# Patient Record
Sex: FEMALE | Race: WHITE | Employment: OTHER | URBAN - METROPOLITAN AREA
[De-identification: names, ages, dates, MRNs, and addresses within clinical notes are randomized per-mention and may not be internally consistent; named-entity substitution may affect disease eponyms.]

---

## 2020-07-17 ENCOUNTER — OFFICE VISIT (OUTPATIENT)
Dept: PRIMARY CARE CLINIC | Age: 70
End: 2020-07-17
Payer: MEDICARE

## 2020-07-17 PROCEDURE — G8428 CUR MEDS NOT DOCUMENT: HCPCS | Performed by: NURSE PRACTITIONER

## 2020-07-17 PROCEDURE — G8421 BMI NOT CALCULATED: HCPCS | Performed by: NURSE PRACTITIONER

## 2020-07-17 PROCEDURE — 99211 OFF/OP EST MAY X REQ PHY/QHP: CPT | Performed by: NURSE PRACTITIONER

## 2020-07-19 LAB
SARS-COV-2: NOT DETECTED
SOURCE: NORMAL

## 2020-07-20 RX ORDER — FLUTICASONE PROPIONATE 50 MCG
1 SPRAY, SUSPENSION (ML) NASAL DAILY
COMMUNITY

## 2020-07-20 RX ORDER — OMEPRAZOLE 20 MG/1
20 CAPSULE, DELAYED RELEASE ORAL DAILY
COMMUNITY

## 2020-07-20 RX ORDER — LIOTHYRONINE SODIUM 5 UG/1
5 TABLET ORAL DAILY
COMMUNITY

## 2020-07-20 RX ORDER — LISINOPRIL 5 MG/1
5 TABLET ORAL DAILY
COMMUNITY

## 2020-07-20 RX ORDER — SIMVASTATIN 10 MG
10 TABLET ORAL NIGHTLY
COMMUNITY

## 2020-07-20 RX ORDER — LEVOTHYROXINE SODIUM 0.12 MG/1
125 TABLET ORAL DAILY
COMMUNITY

## 2020-07-20 RX ORDER — ONDANSETRON 4 MG/1
4 TABLET, FILM COATED ORAL EVERY 8 HOURS PRN
COMMUNITY

## 2020-07-20 RX ORDER — MAGNESIUM 30 MG
250 TABLET ORAL DAILY
COMMUNITY

## 2020-07-20 RX ORDER — WARFARIN SODIUM 2 MG/1
2 TABLET ORAL DAILY
COMMUNITY

## 2020-07-20 NOTE — PROGRESS NOTES
Preoperative Screening for Elective Surgery/Invasive Procedures While COVID-19 present in the community     Have you tested positive or have been told to self-isolate for COVID-19 like symptoms within the past 28 days? n   Do you currently have any of the following symptoms? n  o Fever >100.0 F or 99.9 F in immunocompromised patients?n  o New onset cough, shortness of breath or difficulty breathing?n  o New onset sore throat, myalgia (muscle aches and pains), headache, loss of taste/smell or diarrhea?n   Have you had a potential exposure to COVID-19 within the past 14 days by:  o Close contact with a confirmed case?n  o Close contact with a healthcare worker,  or essential infrastructure worker (grocery store, [employer], gas station, public utilities or transportation)? YES  o Do you reside in a congregate setting such as; skilled nursing facility, adult home, correctional facility, homeless shelter or other institutional setting?n  o Have you had recent travel to a known COVID-19 hotspot? pt resides in [facility] if the patient has a positive screen by answering yes to one or more of the above questions. Patients who test positive or screen positive prior to surgery or on the day of surgery should be evaluated in conjunction with the surgeon/proceduralist/anesthesiologist to determine the urgency of the procedure.

## 2020-07-20 NOTE — PROGRESS NOTES
C-Difficile admission screening and protocol:     * Admitted with diarrhea? n     *Prior history of C-Diff. In last 3 months?n     *Antibiotic use in the past 6-8 weeks?n     *Prior hospitalization or nursing home in the last month? n       [address] time__________830__        Surgery time____________    Take the following medications with a sip of water:    Do not eat or drink anything after 12:00 midnight prior to your surgery. This includes water chewing gum, mints and ice chips. You may brush your teeth and gargle the morning of your surgery, but do not swallow the water     Please see your family doctor/pediatrician for a history and physical and/or concerning medications. Bring any test results/reports from your physicians office. If you are under the care of a heart doctor or specialist doctor, please be aware that you may be asked to them for clearance    You may be asked to stop blood thinners such as Coumadin, Plavix, Fragmin, Lovenox, etc., or any anti-inflammatories such as:  Aspirin, Ibuprofen, Advil, Naproxen prior to your surgery. We also ask that you stop any OTC medications such as fish oil, vitamin E, glucosamine, garlic, Multivitamins, COQ 10, etc.    We ask that you do not smoke 24 hours prior to surgery  We ask that you do not  drink any alcoholic beverages 24 hours prior to surgery     You must make arrangements for a responsible adult to take you home after your surgery. For your safety you will not be allowed to leave alone or drive yourself home. Your surgery will be cancelled if you do not have a ride home. Also for your safety, it is strongly suggested that someone stay with you the first 24 hours after your surgery. A parent or legal guardian must accompany a child scheduled for surgery and plan to stay at the hospital until the child is discharged. Please do not bring other children with you.     For your comfort, are generalized instructions for all surgical cases, you may be provided with more specific instructions according to your surgery.

## 2020-07-20 NOTE — PROGRESS NOTES
Phone message left to call PAT dept at [phone]  for history review and surgery instructions ON 7/20/2020 @ 7632

## 2020-07-22 ENCOUNTER — ANESTHESIA EVENT (OUTPATIENT)
Dept: ENDOSCOPY | Age: 70
End: 2020-07-22
Payer: MEDICARE

## 2020-07-23 ENCOUNTER — ANESTHESIA (OUTPATIENT)
Dept: ENDOSCOPY | Age: 70
End: 2020-07-23
Payer: MEDICARE

## 2020-07-23 ENCOUNTER — HOSPITAL ENCOUNTER (OUTPATIENT)
Age: 70
Setting detail: OUTPATIENT SURGERY
Discharge: HOME | End: 2020-07-23
Attending: INTERNAL MEDICINE | Admitting: INTERNAL MEDICINE
Payer: MEDICARE

## 2020-07-23 VITALS
BODY MASS INDEX: 39.55 KG/M2 | HEIGHT: 63 IN | TEMPERATURE: 98.5 F | SYSTOLIC BLOOD PRESSURE: 135 MMHG | RESPIRATION RATE: 16 BRPM | HEART RATE: 80 BPM | DIASTOLIC BLOOD PRESSURE: 64 MMHG | WEIGHT: 223.22 LBS | OXYGEN SATURATION: 98 %

## 2020-07-23 VITALS
SYSTOLIC BLOOD PRESSURE: 105 MMHG | DIASTOLIC BLOOD PRESSURE: 49 MMHG | RESPIRATION RATE: 57 BRPM | OXYGEN SATURATION: 99 %

## 2020-07-23 PROCEDURE — 3700000001 HC ADD 15 MINUTES (ANESTHESIA): Performed by: INTERNAL MEDICINE

## 2020-07-23 PROCEDURE — 7100000001 HC PACU RECOVERY - ADDTL 15 MIN: Performed by: INTERNAL MEDICINE

## 2020-07-23 PROCEDURE — 3700000000 HC ANESTHESIA ATTENDED CARE: Performed by: INTERNAL MEDICINE

## 2020-07-23 PROCEDURE — 6360000002 HC RX W HCPCS: Performed by: NURSE ANESTHETIST, CERTIFIED REGISTERED

## 2020-07-23 PROCEDURE — 7100000011 HC PHASE II RECOVERY - ADDTL 15 MIN: Performed by: INTERNAL MEDICINE

## 2020-07-23 PROCEDURE — 88305 TISSUE EXAM BY PATHOLOGIST: CPT

## 2020-07-23 PROCEDURE — 7100000010 HC PHASE II RECOVERY - FIRST 15 MIN: Performed by: INTERNAL MEDICINE

## 2020-07-23 PROCEDURE — 3609012400 HC EGD TRANSORAL BIOPSY SINGLE/MULTIPLE: Performed by: INTERNAL MEDICINE

## 2020-07-23 PROCEDURE — 2580000003 HC RX 258: Performed by: ANESTHESIOLOGY

## 2020-07-23 PROCEDURE — 7100000000 HC PACU RECOVERY - FIRST 15 MIN: Performed by: INTERNAL MEDICINE

## 2020-07-23 PROCEDURE — 2500000003 HC RX 250 WO HCPCS: Performed by: NURSE ANESTHETIST, CERTIFIED REGISTERED

## 2020-07-23 PROCEDURE — 2709999900 HC NON-CHARGEABLE SUPPLY: Performed by: INTERNAL MEDICINE

## 2020-07-23 RX ORDER — PROPOFOL 10 MG/ML
INJECTION, EMULSION INTRAVENOUS PRN
Status: DISCONTINUED | OUTPATIENT
Start: 2020-07-23 | End: 2020-07-23 | Stop reason: SDUPTHER

## 2020-07-23 RX ORDER — SODIUM CHLORIDE 9 MG/ML
INJECTION, SOLUTION INTRAVENOUS CONTINUOUS
Status: DISCONTINUED | OUTPATIENT
Start: 2020-07-23 | End: 2020-07-23 | Stop reason: HOSPADM

## 2020-07-23 RX ORDER — PROPOFOL 10 MG/ML
INJECTION, EMULSION INTRAVENOUS CONTINUOUS PRN
Status: DISCONTINUED | OUTPATIENT
Start: 2020-07-23 | End: 2020-07-23 | Stop reason: SDUPTHER

## 2020-07-23 RX ORDER — SODIUM CHLORIDE 0.9 % (FLUSH) 0.9 %
10 SYRINGE (ML) INJECTION EVERY 12 HOURS SCHEDULED
Status: DISCONTINUED | OUTPATIENT
Start: 2020-07-23 | End: 2020-07-23 | Stop reason: HOSPADM

## 2020-07-23 RX ORDER — LIDOCAINE HYDROCHLORIDE 20 MG/ML
INJECTION, SOLUTION EPIDURAL; INFILTRATION; INTRACAUDAL; PERINEURAL PRN
Status: DISCONTINUED | OUTPATIENT
Start: 2020-07-23 | End: 2020-07-23 | Stop reason: SDUPTHER

## 2020-07-23 RX ORDER — ONDANSETRON 2 MG/ML
4 INJECTION INTRAMUSCULAR; INTRAVENOUS
Status: DISCONTINUED | OUTPATIENT
Start: 2020-07-23 | End: 2020-07-23 | Stop reason: HOSPADM

## 2020-07-23 RX ORDER — SODIUM CHLORIDE 0.9 % (FLUSH) 0.9 %
10 SYRINGE (ML) INJECTION PRN
Status: DISCONTINUED | OUTPATIENT
Start: 2020-07-23 | End: 2020-07-23 | Stop reason: HOSPADM

## 2020-07-23 RX ADMIN — PROPOFOL 100 MCG/KG/MIN: 10 INJECTION, EMULSION INTRAVENOUS at 10:09

## 2020-07-23 RX ADMIN — PROPOFOL 120 MCG/KG/MIN: 10 INJECTION, EMULSION INTRAVENOUS at 10:14

## 2020-07-23 RX ADMIN — PROPOFOL 50 MG: 10 INJECTION, EMULSION INTRAVENOUS at 10:14

## 2020-07-23 RX ADMIN — SODIUM CHLORIDE: 9 INJECTION, SOLUTION INTRAVENOUS at 10:09

## 2020-07-23 RX ADMIN — LIDOCAINE HYDROCHLORIDE 50 MG: 20 INJECTION, SOLUTION EPIDURAL; INFILTRATION; INTRACAUDAL; PERINEURAL at 10:12

## 2020-07-23 ASSESSMENT — PULMONARY FUNCTION TESTS
PIF_VALUE: 0

## 2020-07-23 ASSESSMENT — PAIN SCALES - GENERAL
PAINLEVEL_OUTOF10: 0

## 2020-07-23 NOTE — PROGRESS NOTES
Patient awake and alert. Resp easy unlabored on room air O2 with SaO2 98%. Abdomen rounded soft. Patient denies C/O pain or nausea. VSS. IV patent. Patient stable to transfer to ACU for phase II.

## 2020-07-23 NOTE — H&P
Pre-operative History and Physical    Patient: William Jackson  : 1950  Acct#:     Intended Procedure: EGD    HISTORY OF PRESENT ILLNESS:  The patient is a 81 y.o. female  who presents for/due to nausea and vomiting      Past Medical History:        Diagnosis Date    [facility] [facility]     Cerebral artery occlusion with cerebral infarction ([facility] [address] [facility]     mini-stroke     GERD (gastroesophageal reflux disease)     Hyperlipidemia     Hypertension     Sinus disease     Thyroid disease      Past Surgical History:        Procedure Laterality Date    ABDOMEN SURGERY      CHOLECYSTECTOMY      COLONOSCOPY      COSMETIC SURGERY      eyelid surgery    ENDOSCOPY, COLON, DIAGNOSTIC      HEEL SPUR SURGERY      bi-lateral    HERNIA REPAIR      HYSTERECTOMY      KNEE ARTHROSCOPY      NASAL SINUS SURGERY      PACEMAKER PLACEMENT      PACEMAKER PLACEMENT      TONSILLECTOMY       Medications Prior to Admission:   No current facility-administered medications on file prior to encounter. Current Outpatient Medications on File Prior to Encounter   Medication Sig Dispense Refill    fluticasone (FLONASE) 50 MCG/ACT nasal spray 1 spray by Each Nostril route daily      levothyroxine (SYNTHROID) 125 MCG tablet Take 125 mcg by mouth Daily      liothyronine (CYTOMEL) 5 MCG tablet Take 5 mcg by mouth daily      lisinopril (PRINIVIL;ZESTRIL) 5 MG tablet Take 5 mg by mouth daily      magnesium 30 MG tablet Take 250 mg by mouth daily      ondansetron (ZOFRAN) 4 MG tablet Take 4 mg by mouth every 8 hours as needed for Nausea or Vomiting      omeprazole (PRILOSEC) 20 MG delayed release capsule Take 20 mg by mouth daily      simvastatin (ZOCOR) 10 MG tablet Take 10 mg by mouth nightly      warfarin (COUMADIN) 2 MG tablet Take 2 mg by mouth daily 2.5mg for 6 days and then on Saturday's 5mg. Allergies:  Amlodipine;  Atenolol; Doxycycline; Quinolones; Sulfa antibiotics; and Sulfamethoxazole-trimethoprim    Social History:   TOBACCO:   reports that she has never smoked. She has never used smokeless tobacco.  ETOH:   reports current alcohol use. DRUGS:   reports no history of drug use. PHYSICAL EXAM:      Vital Signs: BP (!) 137/94   Pulse 70   Temp 98.2 °F (36.8 °C) (Oral)   Resp 14   Ht 5' 3\" (1.6 m)   Wt 223 lb 3.5 oz (101.2 kg)   SpO2 97%   BMI 39.54 kg/m²    Airway: No stridor or wheezing noted. Good air movement  Pulmonary: without wheezes. Clear to auscultation  Cardiac:regular rate and rhythm without loud murmurs  Abdomen:soft, nontender,  Bowel sounds present    Pre-Procedure Assessment / Plan:  1) Nausea and vomiting    ASA Grade:  ASA 3 - Patient with moderate systemic disease with functional limitations  Mallampati Classification:  Class II    Level of Sedation Plan:Deep sedation    Post Procedure plan: Return to same level of care    I assessed the patient and find that the patient is in satisfactory condition to proceed with the planned procedure and sedation plan. I have explained the risk, benefits, and alternatives to the procedure; the patient understands and agrees to proceed. The patient was counseled at length about the risks of yoshi Covid-19 during their perioperative period and any recovery window from their procedure. The patient was made aware that yoshi Covid-19  may worsen their prognosis for recovering from their procedure  and lend to a higher morbidity and/or mortality risk. All material risks, benefits, and reasonable alternatives including postponing the procedure were discussed. The patient does wish to proceed with the procedure at this time.       Delon Paz,   7/23/2020

## 2020-07-23 NOTE — ANESTHESIA PRE PROCEDURE
[facility] Department of Anesthesiology  Pre-Anesthesia Evaluation/Consultation       Name:  Brian Mcdonough  : 1950  Age:  81 y.o. MRN:  [id number]  Date: 2020           Surgeon: Surgeon(s):  Chapis Fontenot.,     Procedure: Procedure(s):  ESOPHAGOGASTRODUODENOSCOPY     Allergies   Allergen Reactions    Amlodipine Rash     Face flushing    Atenolol Shortness Of Breath    Doxycycline Shortness Of Breath and Swelling    Quinolones Hives    Sulfa Antibiotics Hives     bactrim    Sulfamethoxazole-Trimethoprim Hives     There is no problem list on file for this patient. Past Medical History:   Diagnosis Date    A-fib [facility])     Cerebral artery occlusion with cerebral infarction ([facility] [address]     mini-stroke     GERD (gastroesophageal reflux disease)     Hyperlipidemia     Hypertension     Sinus disease     Thyroid disease      Past Surgical History:   Procedure Laterality Date    ABDOMEN SURGERY      CHOLECYSTECTOMY      COLONOSCOPY      COSMETIC SURGERY      eyelid surgery    ENDOSCOPY, COLON, DIAGNOSTIC      HEEL SPUR SURGERY      bi-lateral    HERNIA REPAIR      HYSTERECTOMY      KNEE ARTHROSCOPY      NASAL SINUS SURGERY      PACEMAKER PLACEMENT      PACEMAKER PLACEMENT      TONSILLECTOMY       Social History     Tobacco Use    Smoking status: Never Smoker    Smokeless tobacco: Never Used   Substance Use Topics    Alcohol use: Yes    Drug use: Never     Medications  No current facility-administered medications on file prior to encounter.       Current Outpatient Medications on File Prior to Encounter   Medication Sig Dispense Refill    fluticasone (FLONASE) 50 MCG/ACT nasal spray 1 spray by Each Nostril route daily      levothyroxine (SYNTHROID) 125 MCG tablet Take 125 mcg by mouth Daily      liothyronine (CYTOMEL) 5 MCG tablet Take 5 mcg by mouth daily      lisinopril (PRINIVIL;ZESTRIL) 5 MG tablet Take 5 mg by mouth daily      magnesium 30 MG tablet Take 250 mg by mouth daily      ondansetron (ZOFRAN) 4 MG tablet Take 4 mg by mouth every 8 hours as needed for Nausea or Vomiting      omeprazole (PRILOSEC) 20 MG delayed release capsule Take 20 mg by mouth daily      simvastatin (ZOCOR) 10 MG tablet Take 10 mg by mouth nightly      warfarin (COUMADIN) 2 MG tablet Take 2 mg by mouth daily 2.5mg for 6 days and then on Saturday's 5mg. Current Facility-Administered Medications   Medication Dose Route Frequency Provider Last Rate Last Dose    0.9 % sodium chloride infusion   Intravenous Continuous Matt Truong MD        sodium chloride flush 0.9 % injection 10 mL  10 mL Intravenous 2 times per day Matt Truong MD        sodium chloride flush 0.9 % injection 10 mL  10 mL Intravenous PRN Matt Truong MD         Vital Signs (Current)   Vitals:    07/20/20 1147   Weight: 220 lb (99.8 kg)   Height: 5' 3\" (1.6 m)                                          BP Readings from Last 3 Encounters:   No data found for BP     Vital Signs Statistics (for past 48 hrs)     No data recorded  BP Readings from Last 3 Encounters:   No data found for BP       BMI  Body mass index is 38.97 kg/m². Estimated body mass index is 38.97 kg/m² as calculated from the following:    Height as of this encounter: 5' 3\" (1.6 m). Weight as of this encounter: 220 lb (99.8 kg).     CBC   Lab Results   Component Value Date    WBC 7.3 02/01/2010    RBC 4.26 02/01/2010    HGB 13.8 02/01/2010    HCT 39.4 02/01/2010    MCV 92.5 02/01/2010    RDW 12.3 02/01/2010     02/01/2010     CMP    Lab Results   Component Value Date     02/01/2010    K 4.3 02/01/2010     02/01/2010    CO2 31 02/01/2010    BUN 16 02/01/2010    CREATININE 0.7 02/01/2010    GFRAA >60 02/01/2010    GLUCOSE 106 02/01/2010    CALCIUM 9.3 02/01/2010     BMP    Lab Results   Component Value Date     02/01/2010    K 4.3 02/01/2010     02/01/2010 CO2 31 02/01/2010    BUN 16 02/01/2010    CREATININE 0.7 02/01/2010    CALCIUM 9.3 02/01/2010    GFRAA >60 02/01/2010    GLUCOSE 106 02/01/2010     POCGlucose  No results for input(s): GLUCOSE in the last 72 hours. [facility]    Lab Results   Component Value Date    PROTIME 11.6 01/31/2010    INR 1.09 01/31/2010    APTT 31.6 71/97/4152     HCG (If Applicable) No results found for: PREGTESTUR, PREGSERUM, HCG, HCGQUANT   ABGs No results found for: PHART, PO2ART, EQU9IQR, ULT4QOG, BEART, Q1UKPQQI   Type & Screen (If Applicable)  No results found for: LABABO, LABRH                         BMI: Wt Readings from Last 3 Encounters:       NPO Status:                          Anesthesia Evaluation  Patient summary reviewed no history of anesthetic complications:   Airway: Mallampati: II        Dental:          Pulmonary:       (-) pneumonia                           Cardiovascular:    (+) hypertension:, pacemaker: pacemaker, dysrhythmias: atrial fibrillation, hyperlipidemia                  Neuro/Psych:   (+) CVA:, TIA,             GI/Hepatic/Renal:   (+) GERD:, morbid obesity     (-) bowel prep       Endo/Other:    (+) hypothyroidism, blood dyscrasia: anticoagulation therapy:., no malignancy/cancer. (-) arthritis, no malignancy/cancer               Abdominal:   (+) obese,         Vascular: negative vascular ROS. Anesthesia Plan      MAC     ASA 3       Induction: intravenous. Anesthetic plan and risks discussed with patient. Plan discussed with CRNA. Attending anesthesiologist reviewed and agrees with Pre Eval content              This pre-anesthesia assessment may be used as a history and physical.    DOS STAFF ADDENDUM:    Pt seen and examined, chart reviewed (including anesthesia, drug and allergy history). No interval changes to history and physical examination. Anesthetic plan, risks, benefits, alternatives, and personnel involved discussed with patient.   Patient

## 2020-07-23 NOTE — ANESTHESIA POSTPROCEDURE EVALUATION
Department of Anesthesiology  Postprocedure Note    Patient: Simran Valdivia  MRN: [id number]  YOB: 1950  Date of evaluation: 7/23/2020  Time:  11:33 AM     Procedure Summary     Date:  07/23/20 Room / Location:  [address] [place] [address]    Anesthesia Start:  1009 Anesthesia Stop:  8200    Procedure:  EGD BIOPSY (N/A ) Diagnosis:  (GASTROESOPHAGEAL REFLUX DISEASE)    Surgeon:  Sterling Young DO Responsible Provider:  Sindhu Patrick MD    Anesthesia Type:  MAC ASA Status:  3          Anesthesia Type: MAC    Rachel Phase I: Rachel Score: 10    Rachel Phase II: Rachel Score: 10    Last vitals: Reviewed and per EMR flowsheets.        Anesthesia Post Evaluation    Patient location during evaluation: bedside  Patient participation: complete - patient participated  Level of consciousness: awake  Pain score: 0  Airway patency: patent  Nausea & Vomiting: no nausea and no vomiting  Complications: no  Cardiovascular status: blood pressure returned to baseline  Respiratory status: acceptable  Hydration status: euvolemic

## 2020-07-23 NOTE — OP NOTE
Endoscopy Note    Patient: Sabas Hanks  : 1950  Acct#:     Procedure: Esophagogastroduodenoscopy with biopsy                         Date:  2020     Surgeon:   Angelika Beverly DO    Referring Physician:  Yaa Garnett    Indications: This is a 81y.o. year old female who presents today with nausea and vomiting. Postoperative Diagnosis:   1) The esophagus was normal without evidence of esophagitis, Leo's esophagus, strictures, rings, furrowing, masses, or nodules. 2) No significant hiatal hernia was noted. 3) There was bile stained liquid noted in the stomach. 4) Mild erythema of the antrum and pre-pyloric stomach. Biopsies were obtained from the antrum and body of the stomach to rule out active gastritis and H.pylori gastritis. 5) There was a normal appearing duodenal bulb and second portion of the duodenum. Anesthesia:  The patient was administered TIVA per anesthesiology team.  Please see their operative records for full details of medications administered. Consent:  The patient or their legal guardian has signed an informed consent, and is aware of the potential risks, benefits, alternatives, and potential complications of this procedure. These include, but are not limited to hemorrhage, bleeding, post procedural pain, perforation, phlebitis, aspiration, hypotension, hypoxia, cardiovascular events such as arryhthmia, and possibly death. Description of Procedure: The patient was then taken to the endoscopy suite, placed in the left lateral decubitus position and the above IV sedation was administrered. The Olympus video endoscope was placed through the patient's oropharynx without difficulty to the extent of the 2nd portion of the duodenum. Both forward and retroflexed views of the stomach were obtained.       Findings:     1) The esophagus was normal without evidence of esophagitis, Leo's esophagus, strictures, rings, furrowing, masses, or nodules. 2) No significant hiatal hernia was noted. 3) There was bile stained liquid noted in the stomach. 4) Mild erythema of the antrum and pre-pyloric stomach. Biopsies were obtained from the antrum and body of the stomach to rule out active gastritis and H.pylori gastritis. 5) There was a normal appearing duodenal bulb and second portion of the duodenum. The scope was then withdrawn back into the stomach, it was decompressed, and the scope was completely withdrawn. The patient tolerated the procedure well and was taken to the post anesthesia care unit in good condition. Estimated blood loss: None    ID Type Source Tests Collected by Time Destination   A : antral biopsies evalaute for HP Gastric Gastric SURGICAL PATHOLOGY Kenneth García., DO 7/23/2020 1020            Impression:   1) See post procedure diagnoses    Recommendations:   1) Advance diet as tolerated  2) Follow up on biopsy results. 3) If biopsies are negative, we will need to set the patient up for 4 hour gastric emptying study.          Kenneth García, DO  [address] [facility] and [address] [facility]

## 2020-08-07 ENCOUNTER — HOSPITAL ENCOUNTER (OUTPATIENT)
Dept: NUCLEAR MEDICINE | Age: 70
Discharge: HOME | End: 2020-08-07
Payer: MEDICARE

## 2020-08-07 PROCEDURE — A9541 TC99M SULFUR COLLOID: HCPCS | Performed by: INTERNAL MEDICINE

## 2020-08-07 PROCEDURE — 3430000000 HC RX DIAGNOSTIC RADIOPHARMACEUTICAL: Performed by: INTERNAL MEDICINE

## 2020-08-07 PROCEDURE — 78264 GASTRIC EMPTYING IMG STUDY: CPT

## 2020-08-07 RX ADMIN — Medication 0.6 MILLICURIE: at 08:45

## 2021-08-23 LAB
ALBUMIN SERPL-MCNC: 4.1 G/DL (ref 3.4–5)
ALP BLD-CCNC: 50 U/L (ref 40–129)
ALT SERPL-CCNC: 19 U/L (ref 10–40)
AST SERPL-CCNC: 19 U/L (ref 15–37)
BILIRUB SERPL-MCNC: 1.2 MG/DL (ref 0–1)
BILIRUBIN DIRECT: <0.2 MG/DL (ref 0–0.3)
BILIRUBIN, INDIRECT: ABNORMAL MG/DL (ref 0–1)
MAGNESIUM: 2.2 MG/DL (ref 1.8–2.4)
TOTAL PROTEIN: 6.1 G/DL (ref 6.4–8.2)

## 2021-08-28 LAB
C282Y HEMOCHROMATOSIS MUT: NEGATIVE
H63D HEMOCHROMATOSIS MUT: NORMAL
HEMOCHROMATOSIS GENE ANALYSIS: NORMAL
HFE PCR SPECIMEN: NORMAL
S65C HEMOCHROMATOSIS MUT: NEGATIVE

## 2024-01-16 ENCOUNTER — OFFICE VISIT (OUTPATIENT)
Dept: ORTHOPEDIC SURGERY | Age: 74
End: 2024-01-16
Payer: MEDICARE

## 2024-01-16 VITALS — BODY MASS INDEX: 39.51 KG/M2 | WEIGHT: 223 LBS | HEIGHT: 63 IN

## 2024-01-16 DIAGNOSIS — S83.241A TEAR OF MEDIAL MENISCUS OF RIGHT KNEE, CURRENT, UNSPECIFIED TEAR TYPE, INITIAL ENCOUNTER: ICD-10-CM

## 2024-01-16 DIAGNOSIS — M25.561 ACUTE PAIN OF RIGHT KNEE: Primary | ICD-10-CM

## 2024-01-16 PROCEDURE — 1123F ACP DISCUSS/DSCN MKR DOCD: CPT | Performed by: NURSE PRACTITIONER

## 2024-01-16 PROCEDURE — L1812 KO ELASTIC W/JOINTS PRE OTS: HCPCS | Performed by: NURSE PRACTITIONER

## 2024-01-16 PROCEDURE — 99203 OFFICE O/P NEW LOW 30 MIN: CPT | Performed by: NURSE PRACTITIONER

## 2024-01-16 NOTE — PROGRESS NOTES
[facility] Orthopedic Surgery  After Hours Clinic Note      CHIEF COMPLAINT: Right knee pain    History Obtained From:  patient    HISTORY OF PRESENT ILLNESS:    The patient is a 73 y.o. female who presents with right knee pain.    Knee Pain: Patient presents with knee pain involving the  right knee. Onset of the symptoms was  Saturday . Inciting event:  She was getting an the passenger side of the car and put her left leg and and then heard a pop and a crack in her right knee and then had difficulty walking.  The next couple days the pain improved, but then worsened again when she woke up this morning.  She is now using a cane to ambulate . Rates pain a 8/10 VAS severe, aching, constant and are worsening. Aggravating factors walking, or straightening the right leg. Alleviating factors rest.Current symptoms include pain located anterior medial knee and popping sensation.  Patient has had prior left knee problems and had a knee scope many years ago, but denies having any issues with the right knee. Evaluation to date: none. Treatment to date: none.  She is unable to take NSAIDs due to being on anticoagulant.  Denies smoking.      Past Medical History:        Diagnosis Date    A-fib (HCC)     Cerebral artery occlusion with cerebral infarction (HCC)     mini-stroke 2012    GERD (gastroesophageal reflux disease)     Hyperlipidemia     Hypertension     Sinus disease     Thyroid disease        Past Surgical History:        Procedure Laterality Date    ABDOMEN SURGERY      CHOLECYSTECTOMY      COLONOSCOPY      COSMETIC SURGERY      eyelid surgery    ENDOSCOPY, COLON, DIAGNOSTIC      HEEL SPUR SURGERY      bi-lateral    HERNIA REPAIR      HYSTERECTOMY (CERVIX STATUS UNKNOWN)      KNEE ARTHROSCOPY      NASAL SINUS SURGERY      PACEMAKER PLACEMENT      PACEMAKER PLACEMENT      TONSILLECTOMY      UPPER GASTROINTESTINAL ENDOSCOPY N/A 7/23/2020    EGD BIOPSY performed by Eliazar Pettit Jr., DO at [facility] ENDOSCOPY       Social

## 2024-01-18 ENCOUNTER — OFFICE VISIT (OUTPATIENT)
Dept: ORTHOPEDIC SURGERY | Age: 74
End: 2024-01-18

## 2024-01-18 VITALS — HEIGHT: 63 IN | WEIGHT: 235 LBS | BODY MASS INDEX: 41.64 KG/M2

## 2024-01-18 DIAGNOSIS — S83.241A ACUTE MEDIAL MENISCUS TEAR OF RIGHT KNEE, INITIAL ENCOUNTER: Primary | ICD-10-CM

## 2024-01-18 RX ORDER — METHYLPREDNISOLONE 4 MG/1
TABLET ORAL
Qty: 1 KIT | Refills: 0 | Status: SHIPPED | OUTPATIENT
Start: 2024-01-18

## 2024-01-18 NOTE — PROGRESS NOTES
degrees.  Tender along the medial joint line.  Pain medially with Tereza. Neurologically, plantar flexion and dorsiflexion is intact. 5/5 strength.     Imaging:  Prior right knee radiographs were reviewed today.  A single tunnel view of the right knee was also performed today.  She has mild medial and patellofemoral joint space narrowing.  There are no significant periarticular osteophytes.  She has near bone-on-bone arthritis of the medial compartment of the left knee.    Assessment:  Mild right knee osteoarthritis with likely degenerative medial meniscus tear    Plan:  We discussed the diagnosis and treatment options.  We discussed treatment with intra-articular steroid injection.  However, she had this issue on the other side and did not improve with a steroid injection.  She is interested in obtaining an MRI of the knee to further evaluate the meniscus.  Based on the results, we will either pursue steroid injection or arthroscopic surgery at that time.  She will follow-up after the  MRI is completed.  A Medrol Dosepak was prescribed to help with pain in the interim.    Total time spent on today's encounter was at least 25 minutes. This time included reviewing prior notes, radiographs, and lab results when available, reviewing history obtained by medical assistant, performing history and physical exam, reviewing tests/radiographs with the patient, counseling the patient, ordering medications or tests, documentation in the electronic health record, and coordination of care.    This dictation was done with [employer] dictation and may contain mechanical errors related to translation.

## 2024-02-08 ENCOUNTER — HOSPITAL ENCOUNTER (OUTPATIENT)
Dept: MRI IMAGING | Age: 74
Discharge: HOME | End: 2024-02-08
Attending: ORTHOPAEDIC SURGERY
Payer: MEDICARE

## 2024-02-08 ENCOUNTER — HOSPITAL ENCOUNTER (OUTPATIENT)
Dept: GENERAL RADIOLOGY | Age: 74
Discharge: HOME | End: 2024-02-08
Attending: ORTHOPAEDIC SURGERY
Payer: MEDICARE

## 2024-02-08 DIAGNOSIS — S83.241A ACUTE MENISCAL TEAR, MEDIAL, RIGHT, INITIAL ENCOUNTER: ICD-10-CM

## 2024-02-08 DIAGNOSIS — S83.241A ACUTE MEDIAL MENISCUS TEAR OF RIGHT KNEE, INITIAL ENCOUNTER: ICD-10-CM

## 2024-02-08 DIAGNOSIS — S20.95XA: Primary | ICD-10-CM

## 2024-02-08 PROCEDURE — 71045 X-RAY EXAM CHEST 1 VIEW: CPT

## 2024-02-08 PROCEDURE — 73721 MRI JNT OF LWR EXTRE W/O DYE: CPT

## 2024-02-19 ENCOUNTER — OFFICE VISIT (OUTPATIENT)
Dept: ORTHOPEDIC SURGERY | Age: 74
End: 2024-02-19
Payer: MEDICARE

## 2024-02-19 VITALS — HEIGHT: 63 IN | RESPIRATION RATE: 18 BRPM | BODY MASS INDEX: 41.64 KG/M2 | WEIGHT: 235 LBS

## 2024-02-19 DIAGNOSIS — M17.11 PRIMARY OSTEOARTHRITIS OF RIGHT KNEE: Primary | ICD-10-CM

## 2024-02-19 PROCEDURE — 99213 OFFICE O/P EST LOW 20 MIN: CPT | Performed by: ORTHOPAEDIC SURGERY

## 2024-02-19 PROCEDURE — 1123F ACP DISCUSS/DSCN MKR DOCD: CPT | Performed by: ORTHOPAEDIC SURGERY

## 2024-02-19 NOTE — PROGRESS NOTES
[facility] Orthopaedics and Spine  Office Visit    Chief Complaint: Right knee pain    HPI:  Siomara Ritter is a 73 y.o. who is here in follow-up of right knee pain.  She was last seen here about 1 month ago.  She was placed on oral steroids that did have helped relieve her pain.  For review, she reports worsening right knee pain last month.  She has had similar issues in the left knee in the past and eventually underwent left knee arthroscopy with partial medial meniscectomy for that.  She denies a history of injury or surgery to the right knee. She is unable to take NSAIDs as she is on blood thinners.  Tylenol has not been helpful.  Symptoms are currently tolerable.  She is here in follow-up of her recent right knee MRI.      Past Medical History:   Diagnosis Date    A-fib (HCC)     Cerebral artery occlusion with cerebral infarction (HCC)     mini-stroke 2012    GERD (gastroesophageal reflux disease)     Hyperlipidemia     Hypertension     Sinus disease     Thyroid disease         ROS:  Constitutional: denies fever, chills, weight loss  MSK: denies pain in other joints, muscle aches  Neurological: denies numbness, tingling, weakness    Exam:  Resp 18   Ht 1.6 m (5' 3\")   Wt 106.6 kg (235 lb)   BMI 41.63 kg/m²      Appearance: sitting in exam room chair, appears to be in no acute distress, awake and alert  Resp: unlabored breathing on room air  Skin: warm, dry and intact with out erythema or significant increased temperature  Neuro: grossly intact both lower extremities. Intact sensation to light touch. Motor exam 4+ to 5/5 in all major motor groups.  Right knee: Negative Stinchfield exam at the hip. Examination reveals that active knee range of motion is 0 to 120 degrees.  Tender along the medial joint line.  Pain medially with Tereza. Neurologically, plantar flexion and dorsiflexion is intact. 5/5 strength.     Imaging:  Prior right knee radiographs were reviewed today.  A single tunnel view of the

## 2024-08-09 RX ORDER — LEVOTHYROXINE SODIUM 0.1 MG/1
100 TABLET ORAL DAILY
COMMUNITY

## 2024-08-09 RX ORDER — MULTIVIT-MIN/IRON/FOLIC ACID/K 18-600-40
CAPSULE ORAL
COMMUNITY

## 2024-08-09 NOTE — PROGRESS NOTES
[facility], [place] ENDOSCOPY COLONOSCOPY PRE-OPERATIVE INSTRUCTIONS    Procedure date__08/14/2024_______Arrival time__1200__________        Surgery time___1300_________       Clear liquids the day before the procedure. Do not eat or drink anything within 5 hours of your procedure.    This includes water chewing gum, mints and ice chips.   You may brush your teeth and gargle the morning of your surgery, but do not swallow the water    You may be asked to stop blood thinners such as Coumadin, Plavix, Fragmin, Lovenox, etc., or any anti-inflammatories such as:  Aspirin, Ibuprofen, Advil, Naproxen prior to your procedure.   We also ask that you stop any OTC medications such as fish oil, vitamin E, glucosamine, garlic, Multivitamins, COQ 10, etc.    You must make arrangements for a responsible adult to arrive with you and stay in our waiting area during your procedure.  They will also need to take you home after your procedure.    For your safety you will not be allowed to leave alone or drive yourself home.    Also for your safety, it is strongly suggested that someone stay with you the first 24 hours after your procedure.    For your comfort, please wear simple loose fitting clothing to the center.  Please do not bring valuables.      If you have a living will and a durable power of  for healthcare, please bring in a copy.     You will need to bring a photo ID and insurance card    Our goal is to provide you with excellent care so if you have any questions, please contact us at the [facility] at [phone]         Please note these are generalized instructions for all colonoscopy cases, you may be provided with more specific instructions if necessary

## 2024-08-13 ENCOUNTER — ANESTHESIA EVENT (OUTPATIENT)
Dept: ENDOSCOPY | Age: 74
End: 2024-08-13
Payer: MEDICARE

## 2024-08-14 ENCOUNTER — HOSPITAL ENCOUNTER (OUTPATIENT)
Age: 74
Setting detail: OUTPATIENT SURGERY
Discharge: HOME | End: 2024-08-14
Attending: INTERNAL MEDICINE | Admitting: INTERNAL MEDICINE
Payer: MEDICARE

## 2024-08-14 ENCOUNTER — ANESTHESIA (OUTPATIENT)
Dept: ENDOSCOPY | Age: 74
End: 2024-08-14
Payer: MEDICARE

## 2024-08-14 VITALS
SYSTOLIC BLOOD PRESSURE: 131 MMHG | HEART RATE: 60 BPM | DIASTOLIC BLOOD PRESSURE: 74 MMHG | TEMPERATURE: 97.3 F | HEIGHT: 63 IN | WEIGHT: 235.8 LBS | RESPIRATION RATE: 16 BRPM | OXYGEN SATURATION: 97 % | BODY MASS INDEX: 41.78 KG/M2

## 2024-08-14 DIAGNOSIS — R19.7 DIARRHEA, UNSPECIFIED TYPE: ICD-10-CM

## 2024-08-14 DIAGNOSIS — K21.9 GASTROESOPHAGEAL REFLUX DISEASE, UNSPECIFIED WHETHER ESOPHAGITIS PRESENT: ICD-10-CM

## 2024-08-14 PROCEDURE — 2500000003 HC RX 250 WO HCPCS

## 2024-08-14 PROCEDURE — 3700000000 HC ANESTHESIA ATTENDED CARE: Performed by: INTERNAL MEDICINE

## 2024-08-14 PROCEDURE — 2709999900 HC NON-CHARGEABLE SUPPLY: Performed by: INTERNAL MEDICINE

## 2024-08-14 PROCEDURE — 3609012400 HC EGD TRANSORAL BIOPSY SINGLE/MULTIPLE: Performed by: INTERNAL MEDICINE

## 2024-08-14 PROCEDURE — 88313 SPECIAL STAINS GROUP 2: CPT

## 2024-08-14 PROCEDURE — 88342 IMHCHEM/IMCYTCHM 1ST ANTB: CPT

## 2024-08-14 PROCEDURE — 2580000003 HC RX 258

## 2024-08-14 PROCEDURE — 7100000010 HC PHASE II RECOVERY - FIRST 15 MIN: Performed by: INTERNAL MEDICINE

## 2024-08-14 PROCEDURE — 3700000001 HC ADD 15 MINUTES (ANESTHESIA): Performed by: INTERNAL MEDICINE

## 2024-08-14 PROCEDURE — 88305 TISSUE EXAM BY PATHOLOGIST: CPT

## 2024-08-14 PROCEDURE — 3609027000 HC COLONOSCOPY: Performed by: INTERNAL MEDICINE

## 2024-08-14 PROCEDURE — 2580000003 HC RX 258: Performed by: STUDENT IN AN ORGANIZED HEALTH CARE EDUCATION/TRAINING PROGRAM

## 2024-08-14 PROCEDURE — 6360000002 HC RX W HCPCS

## 2024-08-14 PROCEDURE — 7100000011 HC PHASE II RECOVERY - ADDTL 15 MIN: Performed by: INTERNAL MEDICINE

## 2024-08-14 RX ORDER — SODIUM CHLORIDE 9 MG/ML
INJECTION, SOLUTION INTRAVENOUS CONTINUOUS PRN
Status: DISCONTINUED | OUTPATIENT
Start: 2024-08-14 | End: 2024-08-14 | Stop reason: SDUPTHER

## 2024-08-14 RX ORDER — SODIUM CHLORIDE 0.9 % (FLUSH) 0.9 %
5-40 SYRINGE (ML) INJECTION PRN
Status: DISCONTINUED | OUTPATIENT
Start: 2024-08-14 | End: 2024-08-14 | Stop reason: HOSPADM

## 2024-08-14 RX ORDER — SODIUM CHLORIDE 9 MG/ML
INJECTION, SOLUTION INTRAVENOUS PRN
Status: DISCONTINUED | OUTPATIENT
Start: 2024-08-14 | End: 2024-08-14 | Stop reason: HOSPADM

## 2024-08-14 RX ORDER — SODIUM CHLORIDE 9 MG/ML
INJECTION, SOLUTION INTRAVENOUS CONTINUOUS PRN
Status: DISCONTINUED | OUTPATIENT
Start: 2024-08-14 | End: 2024-08-14

## 2024-08-14 RX ORDER — IPRATROPIUM BROMIDE AND ALBUTEROL SULFATE 2.5; .5 MG/3ML; MG/3ML
1 SOLUTION RESPIRATORY (INHALATION)
Status: DISCONTINUED | OUTPATIENT
Start: 2024-08-14 | End: 2024-08-14 | Stop reason: HOSPADM

## 2024-08-14 RX ORDER — LIDOCAINE HYDROCHLORIDE 20 MG/ML
INJECTION, SOLUTION EPIDURAL; INFILTRATION; INTRACAUDAL; PERINEURAL PRN
Status: DISCONTINUED | OUTPATIENT
Start: 2024-08-14 | End: 2024-08-14 | Stop reason: SDUPTHER

## 2024-08-14 RX ORDER — PROPOFOL 10 MG/ML
INJECTION, EMULSION INTRAVENOUS PRN
Status: DISCONTINUED | OUTPATIENT
Start: 2024-08-14 | End: 2024-08-14 | Stop reason: SDUPTHER

## 2024-08-14 RX ORDER — SODIUM CHLORIDE 0.9 % (FLUSH) 0.9 %
5-40 SYRINGE (ML) INJECTION EVERY 12 HOURS SCHEDULED
Status: DISCONTINUED | OUTPATIENT
Start: 2024-08-14 | End: 2024-08-14 | Stop reason: HOSPADM

## 2024-08-14 RX ORDER — PANTOPRAZOLE SODIUM 40 MG/1
40 TABLET, DELAYED RELEASE ORAL
Qty: 90 TABLET | Refills: 1 | Status: SHIPPED | OUTPATIENT
Start: 2024-08-14

## 2024-08-14 RX ADMIN — LIDOCAINE HYDROCHLORIDE 100 MG: 20 INJECTION, SOLUTION EPIDURAL; INFILTRATION; INTRACAUDAL; PERINEURAL at 12:47

## 2024-08-14 RX ADMIN — SODIUM CHLORIDE: 9 INJECTION, SOLUTION INTRAVENOUS at 12:46

## 2024-08-14 RX ADMIN — PROPOFOL 160 MCG/KG/MIN: 10 INJECTION, EMULSION INTRAVENOUS at 12:48

## 2024-08-14 RX ADMIN — SODIUM CHLORIDE: 9 INJECTION, SOLUTION INTRAVENOUS at 12:29

## 2024-08-14 RX ADMIN — PROPOFOL 100 MG: 10 INJECTION, EMULSION INTRAVENOUS at 12:47

## 2024-08-14 ASSESSMENT — LIFESTYLE VARIABLES: SMOKING_STATUS: 0

## 2024-08-14 ASSESSMENT — PAIN - FUNCTIONAL ASSESSMENT
PAIN_FUNCTIONAL_ASSESSMENT: NONE - DENIES PAIN
PAIN_FUNCTIONAL_ASSESSMENT: NONE - DENIES PAIN
PAIN_FUNCTIONAL_ASSESSMENT: 0-10

## 2024-08-14 ASSESSMENT — ENCOUNTER SYMPTOMS: SHORTNESS OF BREATH: 0

## 2024-08-14 NOTE — DISCHARGE INSTRUCTIONS
normal routine as soon as you are comfortable to do so, which is usually the next day after the procedure.   Medications - When taking medications, it's important to:   Take your medication as directed, not more, not less, not at a different time.   Do not stop taking them without consulting your healthcare provider.   Don't share them with anyone else.   Know what effects and side effects to expect, and report them to your healthcare provider.   If you are taking more than one drug, even if it is an over-the-counter medication, herb, or dietary supplement, be sure to check with a physician or pharmacist about drug interactions.   Plan ahead for refills so you don't run out.   Lifestyle Changes - The results of your colonoscopy will determine if any lifestyle changes are necessary.     Follow-up:  The doctor will usually give you a preliminary report after the medication wears off and you are more alert. The results from a biopsy can take as long as 1-2 weeks to be completed.   Schedule a follow-up appointment as directed by your doctor.   You should schedule a follow-up colonoscopy as recommended by your doctor.     Call Your Doctor If Any of the Following Occurs:  Bleeding from your rectum; notify your doctor if you pass a teaspoonful or more of blood   Black, tarry stools   Severe abdominal pain   Hard, swollen abdomen   Signs of infection, including fever or chills   Inability to pass gas or stool   Coughing, shortness of breath, chest pain, severe nausea or vomiting     In case of an emergency, call 911 immediately.

## 2024-08-14 NOTE — H&P
Pre-operative History and Physical    Patient: Siomara Ritter  : 1950  Acct#:     Intended Procedure:  EGD and Colonoscopy    HISTORY OF PRESENT ILLNESS:  The patient is a 73 y.o. female  who presents for/due to nausea, burping, indigestion, history of colon polyps. Reports symptoms worsened after a viral illness in 3/2024.  Last colonoscopy in .       Past Medical History:        Diagnosis Date    A-fib (HCC)     Cerebral artery occlusion with cerebral infarction (HCC)     mini-stroke     GERD (gastroesophageal reflux disease)     Hyperlipidemia     Hypertension     Sinus disease     Thyroid disease      Past Surgical History:        Procedure Laterality Date    ABDOMEN SURGERY      CHOLECYSTECTOMY      COLONOSCOPY      COSMETIC SURGERY      eyelid surgery    ENDOSCOPY, COLON, DIAGNOSTIC      HEEL SPUR SURGERY      bi-lateral    HERNIA REPAIR      HYSTERECTOMY (CERVIX STATUS UNKNOWN)      KNEE ARTHROSCOPY      NASAL SINUS SURGERY      PACEMAKER PLACEMENT      PACEMAKER PLACEMENT      TONSILLECTOMY      UPPER GASTROINTESTINAL ENDOSCOPY N/A 2020    EGD BIOPSY performed by Eliazar Pettit Jr., DO at [facility] ENDOSCOPY     Medications Prior to Admission:   No current facility-administered medications on file prior to encounter.     Current Outpatient Medications on File Prior to Encounter   Medication Sig Dispense Refill    levothyroxine (SYNTHROID) 100 MCG tablet Take 1 tablet by mouth Daily      Cholecalciferol (VITAMIN D) 50 MCG ( UT) CAPS capsule Take by mouth      lisinopril (PRINIVIL;ZESTRIL) 5 MG tablet Take 1 tablet by mouth daily      magnesium 30 MG tablet Take 250 mg by mouth daily      ondansetron (ZOFRAN) 4 MG tablet Take 1 tablet by mouth every 8 hours as needed for Nausea or Vomiting      omeprazole (PRILOSEC) 20 MG delayed release capsule Take 1 capsule by mouth daily      warfarin (COUMADIN) 2 MG tablet Take 1 tablet by mouth daily 2.5mg for 6 days and then on

## 2024-08-14 NOTE — ANESTHESIA POSTPROCEDURE EVALUATION
Department of Anesthesiology  Postprocedure Note    Patient: Siomara Ritter  MRN: [id number]  YOB: 1950  Date of evaluation: 8/14/2024    Procedure Summary       Date: 08/14/24 Room / Location: [place] [address] / [facility]    Anesthesia Start: 1243 Anesthesia Stop: 1313    Procedures:       COLONOSCOPY DIAGNOSTIC      ESOPHAGOGASTRODUODENOSCOPY BIOPSY Diagnosis:       Diarrhea, unspecified type      Gastroesophageal reflux disease, unspecified whether esophagitis present      (Diarrhea, unspecified type [R19.7])      (Gastroesophageal reflux disease, unspecified whether esophagitis present [K21.9])    Surgeons: Eliazar Pettit Jr., DO Responsible Provider: Cornell Ortega MD    Anesthesia Type: MAC ASA Status: 3            Anesthesia Type: MAC    Rachel Phase I: Rachel Score: 10    Rachel Phase II: Rachel Score: 10    Anesthesia Post Evaluation    Patient location during evaluation: PACU  Patient participation: complete - patient participated  Level of consciousness: awake and sleepy but conscious  Airway patency: patent  Nausea & Vomiting: no nausea and no vomiting  Cardiovascular status: hemodynamically stable and blood pressure returned to baseline  Respiratory status: spontaneous ventilation, nonlabored ventilation and room air  Hydration status: stable  Comments: Uneventful MAC anesthetic. Ms. Ritter was seen resting comfortably following the procedure. Will allow patient to become more alert before anticipated discharge home with . No acute concerns.  Pain management: adequate    No notable events documented.

## 2024-08-14 NOTE — OP NOTE
Endoscopy Note    Patient: Siomara Ritter  : 1950  Acct#:     Procedure: Esophagogastroduodenoscopy with biopsy                       Colonoscopy     Date:  2024     Endoscopist:   Eliazar Pettit Jr, DO    Referring Physician:  Dawna Carrion MD    Indications: This is a  73 y.o. female  who presents for/due to nausea, burping, indigestion, history of colon polyps. Reports symptoms worsened after a viral illness in 3/2024.  Last colonoscopy in .     Previous Colonoscopy: YES  Date:    Greater than 3 years: YES    Postoperative Diagnosis:   1) The esophagus was normal without evidence of esophagitis, Leo's esophagus, strictures, rings, furrowing, masses, or nodules. The gastroesophageal junction was at 37 cm from the incisors  2) There was a small 1-2 cm sliding hiatal hernia noted.  3) There was a mild amount of bile stained liquid noted in the stomach.  There was no retained food in the stomach 4) Several 4-6 mm polyps were noted in the fundus and body of the stomach.  These were consistent with fundic gland polyps.  These were biopsied and not removed.  5) There was an inflammatory appearing polyp in the antrum of the stomach measuring 1cm.  This was pedunculated.  This was biopsied.   6) Mild erythema of the antrum.  Biopsies were obtained from the antrum and body of the stomach to rule out active gastritis and H.pylori gastritis.   7) There was a normal appearing duodenal bulb and second portion of the duodenum. 8) The visualized colonic mucosa was without visible abnormalities.  Specifically, no colon polyps were identified.  9) Mild left sided diverticulosis was noted. 10) Moderate internal hemorrhoids were noted.         Anesthesia:  The patient was administered TIVA per anesthesiology team.  Please see their operative records for full details of medications administered.       Consent:  The patient or their legal guardian has signed a consent, and is aware of

## 2024-08-14 NOTE — ANESTHESIA PRE PROCEDURE
Department of Anesthesiology  Preprocedure Note       Name:  Siomara Ritter   Age:  73 y.o.  :  1950                                          MRN:  [id number]         Date:  2024      Surgeon: Surgeon(s):  Eliazar Pettit Jr., DO    Procedure: Procedure(s):  COLONOSCOPY DIAGNOSTIC  ESOPHAGOGASTRODUODENOSCOPY    Medications prior to admission:   Prior to Admission medications    Medication Sig Start Date End Date Taking? Authorizing Provider   levothyroxine (SYNTHROID) 100 MCG tablet Take 1 tablet by mouth Daily   Yes Glory Naidu MD   Cholecalciferol (VITAMIN D) 50 MCG (2000) CAPS capsule Take by mouth   Yes Glory Naidu MD   lisinopril (PRINIVIL;ZESTRIL) 5 MG tablet Take 1 tablet by mouth daily    Glory Naidu MD   magnesium 30 MG tablet Take 250 mg by mouth daily    Glory Naidu MD   ondansetron (ZOFRAN) 4 MG tablet Take 1 tablet by mouth every 8 hours as needed for Nausea or Vomiting    Glory Naidu MD   omeprazole (PRILOSEC) 20 MG delayed release capsule Take 1 capsule by mouth daily    Glory Naidu MD   warfarin (COUMADIN) 2 MG tablet Take 1 tablet by mouth daily 2.5mg for 6 days and then on Saturday's 5mg.    Glory Naidu MD       Current medications:    No current facility-administered medications for this encounter.       Allergies:    Allergies   Allergen Reactions    Amlodipine Rash     Face flushing    Atenolol Shortness Of Breath    Doxycycline Shortness Of Breath and Swelling    Quinolones Hives    Sulfa Antibiotics Hives     bactrim    Statins     Sulfamethoxazole-Trimethoprim Hives       Problem List:  There is no problem list on file for this patient.      Past Medical History:        Diagnosis Date    A-fib (HCC)     Cerebral artery occlusion with cerebral infarction (HCC)     mini-stroke     GERD (gastroesophageal reflux disease)     Hyperlipidemia     Hypertension     Sinus disease     Thyroid disease

## 2024-09-13 SDOH — HEALTH STABILITY: PHYSICAL HEALTH: ON AVERAGE, HOW MANY MINUTES DO YOU ENGAGE IN EXERCISE AT THIS LEVEL?: 20 MIN

## 2024-09-13 SDOH — HEALTH STABILITY: PHYSICAL HEALTH: ON AVERAGE, HOW MANY DAYS PER WEEK DO YOU ENGAGE IN MODERATE TO STRENUOUS EXERCISE (LIKE A BRISK WALK)?: 2 DAYS

## 2024-09-16 ENCOUNTER — OFFICE VISIT (OUTPATIENT)
Dept: ORTHOPEDIC SURGERY | Age: 74
End: 2024-09-16
Payer: MEDICARE

## 2024-09-16 VITALS — WEIGHT: 235 LBS | RESPIRATION RATE: 16 BRPM | HEIGHT: 63 IN | BODY MASS INDEX: 41.64 KG/M2

## 2024-09-16 DIAGNOSIS — M17.12 PRIMARY OSTEOARTHRITIS OF LEFT KNEE: Primary | ICD-10-CM

## 2024-09-16 PROCEDURE — 1123F ACP DISCUSS/DSCN MKR DOCD: CPT | Performed by: ORTHOPAEDIC SURGERY

## 2024-09-16 PROCEDURE — 20610 DRAIN/INJ JOINT/BURSA W/O US: CPT | Performed by: ORTHOPAEDIC SURGERY

## 2024-09-16 PROCEDURE — 99213 OFFICE O/P EST LOW 20 MIN: CPT | Performed by: ORTHOPAEDIC SURGERY

## 2024-09-16 RX ORDER — TRIAMCINOLONE ACETONIDE 40 MG/ML
40 INJECTION, SUSPENSION INTRA-ARTICULAR; INTRAMUSCULAR ONCE
Status: COMPLETED | OUTPATIENT
Start: 2024-09-16 | End: 2024-09-16

## 2024-09-16 RX ORDER — BUPIVACAINE HYDROCHLORIDE 2.5 MG/ML
2 INJECTION, SOLUTION INFILTRATION; PERINEURAL ONCE
Status: COMPLETED | OUTPATIENT
Start: 2024-09-16 | End: 2024-09-16

## 2024-09-16 RX ADMIN — TRIAMCINOLONE ACETONIDE 40 MG: 40 INJECTION, SUSPENSION INTRA-ARTICULAR; INTRAMUSCULAR at 11:35

## 2024-09-16 RX ADMIN — BUPIVACAINE HYDROCHLORIDE 5 MG: 2.5 INJECTION, SOLUTION INFILTRATION; PERINEURAL at 11:35

## (undated) DEVICE — FORCEPS BX 240CM 2.4MM L NDL RAD JAW 4 M00513334

## (undated) DEVICE — BITE BLOCK ENDOSCP AD 60 FR W/ ADJ STRP PLAS GRN BLOX

## (undated) DEVICE — ENDOSCOPY KIT: Brand: MEDLINE INDUSTRIES, INC.

## (undated) DEVICE — CONTAINER SPEC 480ML CLR POLYSTYR 10% NEUT BUFF FRMLN ZN